# Patient Record
Sex: MALE | Employment: FULL TIME | URBAN - METROPOLITAN AREA
[De-identification: names, ages, dates, MRNs, and addresses within clinical notes are randomized per-mention and may not be internally consistent; named-entity substitution may affect disease eponyms.]

---

## 2021-03-02 ENCOUNTER — OFFICE VISIT (OUTPATIENT)
Dept: URGENT CARE | Facility: CLINIC | Age: 21
End: 2021-03-02
Payer: COMMERCIAL

## 2021-03-02 VITALS
RESPIRATION RATE: 16 BRPM | WEIGHT: 138 LBS | HEART RATE: 83 BPM | TEMPERATURE: 98.3 F | DIASTOLIC BLOOD PRESSURE: 66 MMHG | SYSTOLIC BLOOD PRESSURE: 118 MMHG | OXYGEN SATURATION: 100 %

## 2021-03-02 DIAGNOSIS — R11.0 NAUSEA: Primary | ICD-10-CM

## 2021-03-02 PROCEDURE — 99203 OFFICE O/P NEW LOW 30 MIN: CPT | Performed by: FAMILY MEDICINE

## 2021-03-02 RX ORDER — ONDANSETRON 4 MG/1
4 TABLET, ORALLY DISINTEGRATING ORAL EVERY 6 HOURS PRN
Qty: 20 TABLET | Refills: 0 | Status: SHIPPED | OUTPATIENT
Start: 2021-03-02

## 2021-03-02 NOTE — PROGRESS NOTES
[facility]        NAME: Davida Olszewski is a 22 y o  male  : 2000    MRN: [id number]  DATE: 2021  TIME: 2:09 PM    Assessment and Plan   Nausea [R11 0]  1  Nausea  ondansetron (ZOFRAN-ODT) 4 mg disintegrating tablet     Concern for possible abdominal aortic aneurysm due to pulsatile mass on abdominal palpation  Low-normotensive blood pressure places him at low risk of imminent rupture  Possible undiagnosed connective tissue disorder if aneurysm is confirmed  Patient instructed to initiate care with a PCP for continued evaluation and management  Zofran prescribed for symptomatic relief  Patient Instructions     Follow up with PCP in 3-5 days  Proceed to  ER if symptoms worsen  Chief Complaint     Chief Complaint   Patient presents with    Vomiting     pt presents with vomitting that comes and goes for several months/ decreased eating         History of Present Illness     60-year-old healthy male presents today due to intermittent waves of nausea which have persisted over the past 4 months  Unsure of its cause  Has improved his diet and is hydrating more with water which has somewhat improved his symptoms  Otherwise he does not recall any worsening or relieving factors  Works as a conner in well ventilated places  Denies any obvious dizziness, but sometimes wakes up with mild headaches which resolved with drinking water  Patient is not sure of family medical history  Review of Systems   Review of Systems   Constitutional: Negative for chills and fever  Respiratory: Negative for cough, shortness of breath and wheezing  Cardiovascular: Negative for chest pain  Gastrointestinal: Positive for abdominal pain (discomfort), diarrhea, nausea and vomiting  Negative for abdominal distention, blood in stool and constipation  Neurological: Positive for headaches  Negative for dizziness  Psychiatric/Behavioral: The patient is not nervous/anxious          Current Medications       Current Outpatient Medications:     ondansetron (ZOFRAN-ODT) 4 mg disintegrating tablet, Take 1 tablet (4 mg total) by mouth every 6 (six) hours as needed for nausea or vomiting, Disp: 20 tablet, Rfl: 0    Current Allergies     Allergies as of 03/02/2021    (No Known Allergies)            The following portions of the patient's history were reviewed and updated as appropriate: allergies, current medications, past family history, past medical history, past social history, past surgical history and problem list      Past Medical History:   Diagnosis Date    ADHD (attention deficit hyperactivity disorder)        Past Surgical History:   Procedure Laterality Date    NO PAST SURGERIES         History reviewed  No pertinent family history  Medications have been verified  Objective   /66   Pulse 83   Temp 98 3 °F (36 8 °C)   Resp 16   Wt 62 6 kg (138 lb)   SpO2 100%   No LMP for male patient  Physical Exam     Physical Exam  Vitals signs and nursing note reviewed  Constitutional:       General: He is in acute distress  Appearance: Normal appearance  He is normal weight  He is not ill-appearing, toxic-appearing or diaphoretic  HENT:      Head: Normocephalic and atraumatic  Eyes:      General:         Right eye: No discharge  Left eye: No discharge  Conjunctiva/sclera: Conjunctivae normal    Cardiovascular:      Rate and Rhythm: Normal rate and regular rhythm  Pulmonary:      Effort: Pulmonary effort is normal  No respiratory distress  Breath sounds: Normal breath sounds  No wheezing or rales  Abdominal:      General: Abdomen is flat  There is no distension  Palpations: There is mass (Strong pulsatile sensation just above the umbilicus)  Tenderness: There is abdominal tenderness (Periumbilical discomfort)  There is no guarding or rebound  Hernia: No hernia is present  Comments: Thin abdominal frame     Skin:     General: Skin is warm  Findings: No erythema  Neurological:      General: No focal deficit present  Mental Status: He is alert and oriented to person, place, and time  Psychiatric:         Behavior: Behavior normal          Thought Content:  Thought content normal          Judgment: Judgment normal

## 2021-05-17 ENCOUNTER — OFFICE VISIT (OUTPATIENT)
Dept: URGENT CARE | Facility: CLINIC | Age: 21
End: 2021-05-17
Payer: COMMERCIAL

## 2021-05-17 VITALS
BODY MASS INDEX: 21.33 KG/M2 | WEIGHT: 144 LBS | HEART RATE: 59 BPM | RESPIRATION RATE: 18 BRPM | TEMPERATURE: 97 F | OXYGEN SATURATION: 99 % | HEIGHT: 69 IN

## 2021-05-17 DIAGNOSIS — J30.1 SEASONAL ALLERGIC RHINITIS DUE TO POLLEN: Primary | ICD-10-CM

## 2021-05-17 PROCEDURE — 99213 OFFICE O/P EST LOW 20 MIN: CPT | Performed by: PHYSICIAN ASSISTANT

## 2021-05-17 RX ORDER — MONTELUKAST SODIUM 10 MG/1
10 TABLET ORAL
Qty: 30 TABLET | Refills: 2 | Status: SHIPPED | OUTPATIENT
Start: 2021-05-17 | End: 2021-08-15

## 2021-05-17 NOTE — PATIENT INSTRUCTIONS
Symptoms consistent with seasonal allergies  Exam unremarkable  Prescribed Singulair today  Take one pill daily  Can also add on flonase nasal spray and sudafed for congestion  If having trouble sleeping take benadryl at bed time  If symptoms persist follow up with primary care doctor

## 2021-05-17 NOTE — LETTER
May 17, 2021     Patient: Sharon Pop   YOB: 2000   Date of Visit: 5/17/2021       To Whom it May Concern:    Sharon Pop is under my professional care  He was seen in my office on 5/17/2021  He may return to work on 05/18/2021  If you have any questions or concerns, please don't hesitate to call           Sincerely,          Nallely Ceballos PA-C        CC: No Recipients

## 2021-05-17 NOTE — PROGRESS NOTES
[facility]        NAME: Sharon Pop is a 22 y o  male  : 2000    MRN: [id number]  DATE: May 17, 2021  TIME: 1:00 PM    Assessment and Plan   Seasonal allergic rhinitis due to pollen [J30 1]  1  Seasonal allergic rhinitis due to pollen  montelukast (SINGULAIR) 10 mg tablet         Patient Instructions     Patient Instructions   Symptoms consistent with seasonal allergies  Exam unremarkable  Prescribed Singulair today  Take one pill daily  Can also add on flonase nasal spray and sudafed for congestion  If having trouble sleeping take benadryl at bed time  If symptoms persist follow up with primary care doctor  Follow up with PCP in 3-5 days  Proceed to  ER if symptoms worsen  Chief Complaint     Chief Complaint   Patient presents with    Allergies     stuffy nose, nasal congestion, sneezing, sinus congestion, watery eyes, scratchy throat  Used zyzal, zertec, claritan with no relief         History of Present Illness       45-year-old male presents  With increased his nausea symptoms  Patient notes he gets itchy eyes redness and sinus pressure and congestion during allergy season  He has taking Claritin, Zyrtec, Xyzal and Flonase without much relief  Patient notes he took Benadryl last night and that was helpful  For about 12 hours but then the symptoms came back  He notes he does work outside frequently in  [place] yards and that makes his allergies worse  Patient denies fever, sore throat, cough, headache, chills, nausea / vomiting/ diarrhea  Review of Systems   Review of Systems   Constitutional: Negative for chills, fatigue and fever  HENT: Positive for rhinorrhea, sinus pressure and sneezing  Negative for congestion, ear pain, sore throat and trouble swallowing  Eyes: Positive for itching  Respiratory: Negative for cough and shortness of breath  Gastrointestinal: Negative for diarrhea, nausea and vomiting  Musculoskeletal: Negative for myalgias     Neurological: Negative for headaches  Current Medications       Current Outpatient Medications:     montelukast (SINGULAIR) 10 mg tablet, Take 1 tablet (10 mg total) by mouth daily at bedtime, Disp: 30 tablet, Rfl: 2    ondansetron (ZOFRAN-ODT) 4 mg disintegrating tablet, Take 1 tablet (4 mg total) by mouth every 6 (six) hours as needed for nausea or vomiting (Patient not taking: Reported on 5/17/2021), Disp: 20 tablet, Rfl: 0    Current Allergies     Allergies as of 05/17/2021    (No Known Allergies)            The following portions of the patient's history were reviewed and updated as appropriate: allergies, current medications, past family history, past medical history, past social history, past surgical history and problem list      Past Medical History:   Diagnosis Date    ADHD (attention deficit hyperactivity disorder)        Past Surgical History:   Procedure Laterality Date    NO PAST SURGERIES         No family history on file  Medications have been verified  Objective   Pulse 59   Temp (!) 97 °F (36 1 °C)   Resp 18   Ht 5' 9" (1 753 m)   Wt 65 3 kg (144 lb)   SpO2 99%   BMI 21 27 kg/m²        Physical Exam     Physical Exam  Vitals signs and nursing note reviewed  Constitutional:       Appearance: Normal appearance  HENT:      Right Ear: Tympanic membrane normal       Left Ear: Tympanic membrane normal       Nose: Nose normal       Mouth/Throat:      Mouth: Mucous membranes are moist       Pharynx: No posterior oropharyngeal erythema  Eyes:      Extraocular Movements: Extraocular movements intact  Pupils: Pupils are equal, round, and reactive to light  Cardiovascular:      Rate and Rhythm: Normal rate and regular rhythm  Pulses: Normal pulses  Heart sounds: Normal heart sounds  Pulmonary:      Effort: Pulmonary effort is normal       Breath sounds: Normal breath sounds  Neurological:      Mental Status: He is alert and oriented to person, place, and time

## 2021-09-10 ENCOUNTER — OFFICE VISIT (OUTPATIENT)
Dept: URGENT CARE | Facility: CLINIC | Age: 21
End: 2021-09-10
Payer: COMMERCIAL

## 2021-09-10 VITALS
OXYGEN SATURATION: 99 % | HEART RATE: 66 BPM | BODY MASS INDEX: 22.28 KG/M2 | WEIGHT: 147 LBS | TEMPERATURE: 96.8 F | SYSTOLIC BLOOD PRESSURE: 126 MMHG | DIASTOLIC BLOOD PRESSURE: 87 MMHG | RESPIRATION RATE: 18 BRPM | HEIGHT: 68 IN

## 2021-09-10 DIAGNOSIS — M54.50 ACUTE LEFT-SIDED LOW BACK PAIN WITHOUT SCIATICA: Primary | ICD-10-CM

## 2021-09-10 PROCEDURE — 99212 OFFICE O/P EST SF 10 MIN: CPT | Performed by: PHYSICIAN ASSISTANT

## 2021-09-10 NOTE — PATIENT INSTRUCTIONS
Suspect symptoms of back pain most likely muscular in nature based on history of physical labor at work and physical exam findings consistent with muscle strain/sprain  Recommend taking 600mg ibuprofen every 6hours as needed for discomfort, ice/heat to area as tolerated, and avoiding aggravating factors  Follow up with PCP  Return or be seen in ER with any worsening or progressing symptoms  Patient understands and is agreeable with this plan

## 2021-09-10 NOTE — PROGRESS NOTES
[facility]        NAME: Gregorio Barker is a 22 y o  male  : 2000    MRN: [id number]  DATE: September 10, 2021  TIME: 2:53 PM    Assessment and Plan   Acute left-sided low back pain without sciatica [M54 5]  1  Acute left-sided low back pain without sciatica           Patient Instructions     Patient Instructions   Suspect symptoms of back pain most likely muscular in nature based on history of physical labor at work and physical exam findings consistent with muscle strain/sprain  Recommend taking 600mg ibuprofen every 6hours as needed for discomfort, ice/heat to area as tolerated, and avoiding aggravating factors  Follow up with PCP  Return or be seen in ER with any worsening or progressing symptoms  Patient understands and is agreeable with this plan  Follow up with PCP in 3-5 days  Proceed to  ER if symptoms worsen  Chief Complaint     Chief Complaint   Patient presents with    Back Pain     Pt reports of worsening back pain  Pt has history of chronic back pain  History of Present Illness         Patient is a 71-year-old male presenting today with left-sided lower back pain times 1 week  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, states he believes this is most likely attributing to his low back pain but wanted to make sure that nothing more serious was going on  Has been applying over-the-counter Biofreeze to area with mild resolution of symptoms, has not taken any pain medication  Denies any numbness, tingling, urinary or bowel incontinence, weakness, loss range of motion, trauma, injury, bruising, swelling  Review of Systems   Review of Systems   Constitutional: Negative for chills and fever  HENT: Negative for ear pain and sore throat  Eyes: Negative for pain and visual disturbance  Respiratory: Negative for cough and shortness of breath      Cardiovascular: Negative for chest pain and palpitations  Gastrointestinal: Negative for abdominal pain and vomiting  Genitourinary: Negative for dysuria and hematuria  Musculoskeletal: Positive for back pain (  Left lower back)  Skin: Negative for color change and rash  Neurological: Negative for seizures and syncope  All other systems reviewed and are negative  Current Medications       Current Outpatient Medications:     montelukast (SINGULAIR) 10 mg tablet, Take 1 tablet (10 mg total) by mouth daily at bedtime, Disp: 30 tablet, Rfl: 2    ondansetron (ZOFRAN-ODT) 4 mg disintegrating tablet, Take 1 tablet (4 mg total) by mouth every 6 (six) hours as needed for nausea or vomiting (Patient not taking: Reported on 5/17/2021), Disp: 20 tablet, Rfl: 0    Current Allergies     Allergies as of 09/10/2021    (No Known Allergies)            The following portions of the patient's history were reviewed and updated as appropriate: allergies, current medications, past family history, past medical history, past social history, past surgical history and problem list      Past Medical History:   Diagnosis Date    ADHD (attention deficit hyperactivity disorder)        Past Surgical History:   Procedure Laterality Date    NO PAST SURGERIES         History reviewed  No pertinent family history  Medications have been verified  Objective   /87   Pulse 66   Temp (!) 96 8 °F (36 °C)   Resp 18   Ht 5' 8" (1 727 m)   Wt 66 7 kg (147 lb)   SpO2 99%   BMI 22 35 kg/m²        Physical Exam     Physical Exam  Vitals reviewed  Constitutional:       Appearance: Normal appearance  HENT:      Head: Normocephalic and atraumatic  Right Ear: Tympanic membrane, ear canal and external ear normal       Left Ear: Tympanic membrane, ear canal and external ear normal       Nose: Nose normal       Mouth/Throat:      Mouth: Mucous membranes are moist       Pharynx: Oropharynx is clear     Eyes:      Conjunctiva/sclera: Conjunctivae normal  Pupils: Pupils are equal, round, and reactive to light  Cardiovascular:      Rate and Rhythm: Normal rate and regular rhythm  Pulses: Normal pulses  Heart sounds: Normal heart sounds  Pulmonary:      Effort: Pulmonary effort is normal       Breath sounds: Normal breath sounds  Musculoskeletal:      Cervical back: Normal range of motion  Comments: No obvious deformities of lower back, no bruising, no swelling, mild tenderness to palpation of left lower back in line with area of latissimus dorsi, findings consistent with muscle strain / sprain, no spinous process tenderness, no upper buttock or sciatic tenderness  Full active range of motion of lower extremities bilaterally, 5/5 strength of lower extremities bilaterally, gross sensation intact, normal DTRs, 2+ distal pulses  Skin:     General: Skin is warm  Capillary Refill: Capillary refill takes less than 2 seconds  Neurological:      General: No focal deficit present  Mental Status: He is alert and oriented to person, place, and time

## 2022-10-21 ENCOUNTER — OFFICE VISIT (OUTPATIENT)
Dept: URGENT CARE | Facility: CLINIC | Age: 22
End: 2022-10-21
Payer: COMMERCIAL

## 2022-10-21 VITALS
WEIGHT: 153 LBS | OXYGEN SATURATION: 99 % | HEIGHT: 70 IN | SYSTOLIC BLOOD PRESSURE: 125 MMHG | HEART RATE: 73 BPM | RESPIRATION RATE: 18 BRPM | TEMPERATURE: 98.4 F | BODY MASS INDEX: 21.9 KG/M2 | DIASTOLIC BLOOD PRESSURE: 80 MMHG

## 2022-10-21 DIAGNOSIS — R09.82 ALLERGIC RHINITIS WITH POSTNASAL DRIP: Primary | ICD-10-CM

## 2022-10-21 DIAGNOSIS — J30.9 ALLERGIC RHINITIS WITH POSTNASAL DRIP: Primary | ICD-10-CM

## 2022-10-21 PROCEDURE — 99213 OFFICE O/P EST LOW 20 MIN: CPT | Performed by: PHYSICIAN ASSISTANT

## 2022-10-21 RX ORDER — PSEUDOEPHEDRINE HCL 120 MG/1
120 TABLET, FILM COATED, EXTENDED RELEASE ORAL 2 TIMES DAILY
Qty: 14 TABLET | Refills: 0 | Status: SHIPPED | OUTPATIENT
Start: 2022-10-21

## 2022-10-21 RX ORDER — FLUTICASONE PROPIONATE 50 MCG
1 SPRAY, SUSPENSION (ML) NASAL DAILY
Qty: 18.2 ML | Refills: 0 | Status: SHIPPED | OUTPATIENT
Start: 2022-10-21

## 2022-10-21 NOTE — LETTER
October 21, 2022     Patient: Chucky Verduzco   YOB: 2000   Date of Visit: 10/21/2022       To Whom It May Concern: It is my medical opinion that Chucky Verduzco may return to work on 10/24/2022  If you have any questions or concerns, please don't hesitate to call           Sincerely,        Tracy Adorno PA-C    CC: No Recipients

## 2022-10-21 NOTE — PROGRESS NOTES
[facility]        NAME: Wilhelmina Alpers is a 25 y o  male  : 2000    MRN: [id number]  DATE: 2022  TIME: 11:31 AM    Assessment and Plan   Allergic rhinitis with postnasal drip [J30 9, R09 82]  1  Allergic rhinitis with postnasal drip  fluticasone (FLONASE) 50 mcg/act nasal spray    pseudoephedrine (SUDAFED) 120 MG 12 hr tablet         Patient Instructions   Patient Instructions   Take Flonase and Sudafed as instructed  Can continue over-the-counter ibuprofen or Tylenol as needed for any discomfort  Discussed symptoms most likely related to seasonal allergies and weather change  Follow up with PCP in 3-5 days  Proceed to  ER if symptoms worsen  Chief Complaint     Chief Complaint   Patient presents with   • URI     Worsening URI s/s with sinus pain, pressure  S/S started approx 1 week ago  History of Present Illness       HPI    Review of Systems   Review of Systems   Constitutional: Negative for chills and fever  HENT: Positive for congestion, postnasal drip and sore throat  Negative for ear pain, sinus pressure and trouble swallowing  Eyes: Negative for pain  Respiratory: Positive for cough  Negative for chest tightness and shortness of breath  Cardiovascular: Negative for chest pain  Gastrointestinal: Negative for abdominal pain  Musculoskeletal: Negative for myalgias  Skin: Negative for rash  Neurological: Negative for headaches           Current Medications       Current Outpatient Medications:   •  fluticasone (FLONASE) 50 mcg/act nasal spray, 1 spray into each nostril daily, Disp: 18 2 mL, Rfl: 0  •  pseudoephedrine (SUDAFED) 120 MG 12 hr tablet, Take 1 tablet (120 mg total) by mouth 2 (two) times a day, Disp: 14 tablet, Rfl: 0  •  montelukast (SINGULAIR) 10 mg tablet, Take 1 tablet (10 mg total) by mouth daily at bedtime, Disp: 30 tablet, Rfl: 2  •  ondansetron (ZOFRAN-ODT) 4 mg disintegrating tablet, Take 1 tablet (4 mg total) by mouth every 6 (six) hours as needed for nausea or vomiting (Patient not taking: Reported on 5/17/2021), Disp: 20 tablet, Rfl: 0    Current Allergies     Allergies as of 10/21/2022   • (No Known Allergies)            The following portions of the patient's history were reviewed and updated as appropriate: allergies, current medications, past family history, past medical history, past social history, past surgical history and problem list      Past Medical History:   Diagnosis Date   • ADHD (attention deficit hyperactivity disorder)        Past Surgical History:   Procedure Laterality Date   • NO PAST SURGERIES         History reviewed  No pertinent family history  Medications have been verified  Objective   /80   Pulse 73   Temp 98 4 °F (36 9 °C)   Resp 18   Ht 5' 10" (1 778 m)   Wt 69 4 kg (153 lb)   SpO2 99%   BMI 21 95 kg/m²        Physical Exam     Physical Exam  Vitals reviewed  Constitutional:       General: He is not in acute distress  Appearance: He is well-developed  He is not toxic-appearing  HENT:      Head: Normocephalic and atraumatic  Right Ear: Tympanic membrane, ear canal and external ear normal       Left Ear: Tympanic membrane, ear canal and external ear normal       Nose: Congestion present  Comments: Inflamed nasal mucosa     Mouth/Throat:      Mouth: Mucous membranes are moist       Comments: Mild erythema of pharynx, findings consistent with postnasal drip, no tonsillar swelling erythema or exudate, uvula midline  Eyes:      Conjunctiva/sclera: Conjunctivae normal    Cardiovascular:      Rate and Rhythm: Normal rate and regular rhythm  Pulses: Normal pulses  Heart sounds: Normal heart sounds  Pulmonary:      Effort: Pulmonary effort is normal       Breath sounds: Normal breath sounds  Musculoskeletal:      Cervical back: Normal range of motion  No tenderness  Lymphadenopathy:      Cervical: No cervical adenopathy  Skin:     General: Skin is warm  Capillary Refill: Capillary refill takes less than 2 seconds  Neurological:      General: No focal deficit present  Mental Status: He is alert and oriented to person, place, and time

## 2022-10-21 NOTE — PATIENT INSTRUCTIONS
Take Flonase and Sudafed as instructed  Can continue over-the-counter ibuprofen or Tylenol as needed for any discomfort  Discussed symptoms most likely related to seasonal allergies and weather change